# Patient Record
Sex: FEMALE | Race: WHITE | NOT HISPANIC OR LATINO | URBAN - METROPOLITAN AREA
[De-identification: names, ages, dates, MRNs, and addresses within clinical notes are randomized per-mention and may not be internally consistent; named-entity substitution may affect disease eponyms.]

---

## 2018-12-03 ENCOUNTER — OFFICE (OUTPATIENT)
Dept: URBAN - METROPOLITAN AREA CLINIC 11 | Facility: CLINIC | Age: 58
End: 2018-12-03

## 2018-12-03 VITALS
DIASTOLIC BLOOD PRESSURE: 69 MMHG | WEIGHT: 231 LBS | SYSTOLIC BLOOD PRESSURE: 140 MMHG | HEART RATE: 87 BPM | HEIGHT: 65 IN

## 2018-12-03 DIAGNOSIS — R94.5 ABNORMAL RESULTS OF LIVER FUNCTION STUDIES: ICD-10-CM

## 2018-12-03 DIAGNOSIS — R19.7 DIARRHEA, UNSPECIFIED: ICD-10-CM

## 2018-12-03 DIAGNOSIS — K76.0 FATTY (CHANGE OF) LIVER, NOT ELSEWHERE CLASSIFIED: ICD-10-CM

## 2018-12-03 LAB
ACTIN (SMOOTH MUSCLE) ANTIBODY: 19 UNITS (ref 0–19)
ALPHA-1-ANTITRYPSIN, SERUM: 138 MG/DL (ref 90–200)
ANTINUCLEAR ANTIBODIES, IFA: NEGATIVE
FERRITIN, SERUM: 195 NG/ML — HIGH (ref 15–150)
HBSAG SCREEN: NEGATIVE
HCV ANTIBODY: HEP C VIRUS AB: <0.1 S/CO RATIO
HEP A AB, TOTAL: NEGATIVE
HEP B SURFACE AB: HEP B SURFACE AB, QUAL: NON REACTIVE
MITOCHONDRIAL (M2) ANTIBODY: 8.6 UNITS (ref 0–20)

## 2018-12-03 PROCEDURE — 99214 OFFICE O/P EST MOD 30 MIN: CPT | Performed by: INTERNAL MEDICINE

## 2018-12-19 ENCOUNTER — OFFICE (OUTPATIENT)
Dept: URBAN - METROPOLITAN AREA CLINIC 14 | Facility: CLINIC | Age: 58
End: 2018-12-19

## 2018-12-19 VITALS — HEIGHT: 65 IN

## 2018-12-19 DIAGNOSIS — K76.0 FATTY (CHANGE OF) LIVER, NOT ELSEWHERE CLASSIFIED: ICD-10-CM

## 2018-12-19 PROCEDURE — 91200 LIVER ELASTOGRAPHY: CPT | Performed by: INTERNAL MEDICINE

## 2019-12-11 ENCOUNTER — OFFICE (OUTPATIENT)
Dept: URBAN - METROPOLITAN AREA CLINIC 8 | Facility: CLINIC | Age: 59
End: 2019-12-11

## 2019-12-11 VITALS
HEIGHT: 65 IN | DIASTOLIC BLOOD PRESSURE: 68 MMHG | HEART RATE: 55 BPM | SYSTOLIC BLOOD PRESSURE: 106 MMHG | WEIGHT: 222 LBS

## 2019-12-11 DIAGNOSIS — R93.2 ABNORMAL FINDINGS ON DIAGNOSTIC IMAGING OF LIVER AND BILIARY: ICD-10-CM

## 2019-12-11 DIAGNOSIS — R10.11 RIGHT UPPER QUADRANT PAIN: ICD-10-CM

## 2019-12-11 PROCEDURE — 99214 OFFICE O/P EST MOD 30 MIN: CPT | Performed by: INTERNAL MEDICINE

## 2019-12-23 ENCOUNTER — OFFICE (OUTPATIENT)
Dept: URBAN - METROPOLITAN AREA CLINIC 11 | Facility: CLINIC | Age: 59
End: 2019-12-23

## 2019-12-23 VITALS
HEIGHT: 65 IN | HEART RATE: 70 BPM | WEIGHT: 212 LBS | SYSTOLIC BLOOD PRESSURE: 120 MMHG | DIASTOLIC BLOOD PRESSURE: 64 MMHG

## 2019-12-23 DIAGNOSIS — R19.7 DIARRHEA, UNSPECIFIED: ICD-10-CM

## 2019-12-23 PROCEDURE — 99213 OFFICE O/P EST LOW 20 MIN: CPT | Performed by: INTERNAL MEDICINE

## 2019-12-31 ENCOUNTER — OFFICE (OUTPATIENT)
Dept: URBAN - METROPOLITAN AREA CLINIC 11 | Facility: CLINIC | Age: 59
End: 2019-12-31

## 2019-12-31 LAB
C DIFFICILE TOXINS A+B, EIA: NEGATIVE
GIARDIA LAMBLIA AG, EIA: NEGATIVE
RESULT: RESULT 1: (no result)
RESULT: RESULT 1: (no result)
STOOL CULTURE: CAMPYLOBACTER CULTURE: (no result)
STOOL CULTURE: E COLI SHIGA TOXIN EIA: NEGATIVE
STOOL CULTURE: SALMONELLA/SHIGELLA SCREEN: (no result)

## 2024-07-03 ENCOUNTER — OFFICE (OUTPATIENT)
Dept: URBAN - METROPOLITAN AREA CLINIC 11 | Facility: CLINIC | Age: 64
End: 2024-07-03

## 2024-07-03 VITALS
OXYGEN SATURATION: 98 % | HEIGHT: 65 IN | DIASTOLIC BLOOD PRESSURE: 79 MMHG | HEART RATE: 63 BPM | WEIGHT: 188 LBS | SYSTOLIC BLOOD PRESSURE: 136 MMHG

## 2024-07-03 DIAGNOSIS — R10.11 RIGHT UPPER QUADRANT PAIN: ICD-10-CM

## 2024-07-03 DIAGNOSIS — R19.7 DIARRHEA, UNSPECIFIED: ICD-10-CM

## 2024-07-03 PROCEDURE — 99204 OFFICE O/P NEW MOD 45 MIN: CPT | Performed by: INTERNAL MEDICINE

## 2024-07-03 RX ORDER — SODIUM PICOSULFATE, MAGNESIUM OXIDE, AND ANHYDROUS CITRIC ACID 12; 3.5; 1 G/175ML; G/175ML; MG/175ML
LIQUID ORAL
Qty: 1 | Refills: 0 | Status: ACTIVE
Start: 2024-07-03

## 2024-07-03 NOTE — SERVICENOTES
Attending Attestation:  Patient seen and examined independently by me, Dr. Kong Brothers.  Prior records reviewed and case discussed with JAZMYNE Sexton after which impression and plan were developed.

## 2024-07-03 NOTE — SERVICEHPINOTES
Ms. Hsieh presents to for diarrhea and RUQ pain.  She reports going to the ED twice at [facility].  On May 18th, she was told it was diverticulitis and later found out it was a UTI.  She was having pain and diarrhea still after the being on flagyl and cipro.   On the 23rd she went back she went back for reports of coffee grain stools.  It happened about fives time that day.  She was admitted and stayed overnight.  She was treated for diverticulitis.     bernabe kidd She reports normally dealing with diarrhea for years.  She states it is associated with anxiety and what she eats.  in September 2023 went to [facility] because her blood sugars were elevated.  She had originally been on metformin 500 mg once a day.   She was put on 1000 mg bid of metformin.  Her blood sugars got under control but it didn't help with her diarrhea.  It became uncontrollable and she was afraid to leave the house.  The dose was reduced and she is now on 500 mg bid.  She has stopped eating chocolate and diet cokes also and reports the diarrhea has gotten much better. 
bernabe kidd 
Recently she went to her PCP for RUQ pain.  An US of abdomen was ordered for right back pain that radiates to the RLQ.  They told her gallbladder was ok but spleen was enlarged.  She had GRANT scan ordered but canceled it because of cost. bernabe kidd  She reports having up to five soft bowel movements a day.  She reports sometimes it's yellow or green and thin.  She reports taking up to three Imodium as preventative.  She is taking it maybe 2 or 3 times a week if she has to go somewhere.  She reports having back pain that radiates to the right flank and then into the RLQ.  She descried the pain as an ache.  Laying day or sitting down may help but not always.  Putting a pillow on it help.   She has changed her diet, and the pain is much better.  She was able to work out in the yard with her  and it hurt.  She took a hot bath and rested, and it felt better.  
bernabe kidd She denies any GERD symptoms or dysphagia.
bernabe kidd MD Note:   CT x2 and labs from [facility] reviewedbr